# Patient Record
Sex: FEMALE | URBAN - METROPOLITAN AREA
[De-identification: names, ages, dates, MRNs, and addresses within clinical notes are randomized per-mention and may not be internally consistent; named-entity substitution may affect disease eponyms.]

---

## 2020-08-04 ENCOUNTER — OFFICE VISIT (OUTPATIENT)
Dept: URBAN - METROPOLITAN AREA CLINIC 25 | Facility: CLINIC | Age: 50
End: 2020-08-04
Payer: COMMERCIAL

## 2020-08-04 DIAGNOSIS — H53.19 OTHER SUBJECTIVE VISUAL DISTURBANCES: Primary | ICD-10-CM

## 2020-08-04 DIAGNOSIS — R51 HEADACHE: ICD-10-CM

## 2020-08-04 PROCEDURE — 92004 COMPRE OPH EXAM NEW PT 1/>: CPT | Performed by: OPTOMETRIST

## 2020-08-04 ASSESSMENT — INTRAOCULAR PRESSURE
OD: 17
OS: 17

## 2020-08-04 NOTE — IMPRESSION/PLAN
Impression: Other subjective visual disturbances: H53.19. Plan: pt edu on al findings. no ocular pathology present. Pt will try +1.50 OTC reading glasses. If blur persists then rtc for refraction. If QU[place]TA[place] persists, continue care with pcp/neuro.